# Patient Record
Sex: MALE | Race: WHITE | NOT HISPANIC OR LATINO | Employment: OTHER | URBAN - METROPOLITAN AREA
[De-identification: names, ages, dates, MRNs, and addresses within clinical notes are randomized per-mention and may not be internally consistent; named-entity substitution may affect disease eponyms.]

---

## 2017-07-11 ENCOUNTER — HOSPITAL ENCOUNTER (OUTPATIENT)
Dept: OTHER | Facility: HOSPITAL | Age: 72
Discharge: HOME | End: 2017-07-11
Attending: FAMILY MEDICINE | Admitting: FAMILY MEDICINE

## 2020-03-03 ENCOUNTER — TRANSCRIBE ORDERS (OUTPATIENT)
Dept: ADMINISTRATIVE | Facility: HOSPITAL | Age: 75
End: 2020-03-03

## 2020-03-03 DIAGNOSIS — N50.89 SCROTAL MASS: Primary | ICD-10-CM

## 2020-03-05 ENCOUNTER — HOSPITAL ENCOUNTER (OUTPATIENT)
Dept: ULTRASOUND IMAGING | Facility: HOSPITAL | Age: 75
Discharge: HOME | End: 2020-03-05
Admitting: STUDENT IN AN ORGANIZED HEALTH CARE EDUCATION/TRAINING PROGRAM

## 2020-03-05 DIAGNOSIS — N50.89 SCROTAL MASS: ICD-10-CM

## 2020-03-05 PROCEDURE — 76870 US EXAM SCROTUM: CPT

## 2020-11-10 ENCOUNTER — TRANSCRIBE ORDERS (OUTPATIENT)
Dept: PHYSICAL THERAPY | Facility: CLINIC | Age: 75
End: 2020-11-10

## 2020-11-10 DIAGNOSIS — M54.42 ACUTE LEFT-SIDED LOW BACK PAIN WITH LEFT-SIDED SCIATICA: Primary | ICD-10-CM

## 2020-11-13 ENCOUNTER — TREATMENT (OUTPATIENT)
Dept: PHYSICAL THERAPY | Facility: CLINIC | Age: 75
End: 2020-11-13

## 2020-11-13 DIAGNOSIS — M54.42 ACUTE LEFT-SIDED LOW BACK PAIN WITH LEFT-SIDED SCIATICA: Primary | ICD-10-CM

## 2020-11-13 DIAGNOSIS — R26.2 DIFFICULTY WALKING: ICD-10-CM

## 2020-11-13 PROCEDURE — G0283 ELEC STIM OTHER THAN WOUND: HCPCS | Performed by: PHYSICAL THERAPIST

## 2020-11-13 PROCEDURE — 97161 PT EVAL LOW COMPLEX 20 MIN: CPT | Performed by: PHYSICAL THERAPIST

## 2020-11-13 PROCEDURE — 97140 MANUAL THERAPY 1/> REGIONS: CPT | Performed by: PHYSICAL THERAPIST

## 2020-11-13 NOTE — PROGRESS NOTES
Physical Therapy Initial Evaluation and Plan of Care    Patient: Taqueria Starr   : 1945  Diagnosis/ICD-10 Code:  Midline low back pain with left-sided sciatica, unspecified chronicity [M54.42]  Referring practitioner: Reagan Rogers MD  Date of Initial Visit: 2020  Today's Date: 2020  Patient seen for 1 sessions           Subjective Questionnaire: Oswestry:       Subjective Evaluation    History of Present Illness  Mechanism of injury: Pt is a 74 yo male that returns to this clinic with recent onset radiating LLE pain.  Pain began about 3 weeks ago.  Had been working on nailing trip for wilmer at his daughter's house - kneeling then rising from ground repetitively.  Felt good but about 3 days later was running his saw mill at home and irritated his sciatic nerve.  Pain L calf, thigh and post/lat glut.  Is doing a lot better now than he had been.  Initially pain was so severe he was having trouble getting around and difficulty finding any position of comfort.  Pain currently 0/10 with medication.  Up to 9/10 at times.  Has also noticed some increased incidence of dizziness/unsteadiness over the past 3 weeks.  Feeling ok today.  Has not had a recent fall but has a history of a few significant falls over the past few years.      Quality of life: good    Pain  Current pain ratin  At worst pain ratin  Quality: dull ache and radiating  Relieving factors: change in position  Progression: improved    Social Support  Lives with: spouse    Treatments  Previous treatment: physical therapy  Current treatment: physical therapy  Patient Goals  Patient goals for therapy: decreased pain, independence with ADLs/IADLs and return to sport/leisure activities             Objective          Active Range of Motion     Additional Active Range of Motion Details  Trunk AROM in standing  Flex 50% with inc pull/pain L post thigh  Ext 25% with inc pain L glut  BSB 50% with inc pull on L with LSB  B Rot 75%  with no sig inc pain    Strength/Myotome Testing     Left Hip   Planes of Motion   Flexion: 4+  Abduction: 4+  Adduction: 4+    Right Hip   Planes of Motion   Flexion: 4+  Abduction: 4+  Adduction: 4+    Left Knee   Flexion: 4+  Extension: 4+    Right Knee   Flexion: 4+  Extension: 4+    Left Ankle/Foot   Dorsiflexion: 3  Inversion: 3+  Eversion: 3+    Right Ankle/Foot   Dorsiflexion: 4+  Inversion: 4+  Eversion: 4+     General Comments     Lumbar Comments  Gait - pt presents with mod antalgic gait and noted trendelenburg, no AD   Radiating pain L glut, post thigh and calf - worse with standing/walking  Posture- moderate forward flexed posture with shift to R  Moderate stiffness throughout spine           Assessment & Plan     Assessment  Impairments: abnormal gait, abnormal or restricted ROM, activity intolerance, impaired balance, impaired physical strength, lacks appropriate home exercise program, pain with function and safety issue  Assessment details: Pt presents with inc radiating LLE pain.  Weakness noted L ankle.  Difficulty walking.  Increased pain with standing long periods.  Difficulty sleeping through the night.  Increased dizziness/unsteadiness.    Prognosis: good  Functional Limitations: walking, uncomfortable because of pain and standing  Goals  Plan Goals: STG  Pt I with HEP in 2 weeks  To dec pain to 0-1/10 in 2-3 weeks.  To improve trunk ROM WFL with no inc pain.    LTG  To tolerate sleep through the night with no inc pain in 4-6 weeks.  To report no pain with ADLs in 4-6 weeks.    To report centralization of LLE symptoms in 4-6 weeks.      Plan  Therapy options: will be seen for skilled physical therapy services  Planned modality interventions: cryotherapy, electrical stimulation/Russian stimulation, traction, thermotherapy (hydrocollator packs) and dry needling  Planned therapy interventions: abdominal trunk stabilization, body mechanics training, flexibility, functional ROM exercises, home  exercise program, manual therapy, strengthening, stretching, therapeutic activities, balance/weight-bearing training, gait training, neuromuscular re-education and postural training  Frequency: 2x week  Duration in visits: 20  Treatment plan discussed with: patient  Plan details: Began with lumbar and LLE assessment.  Completed gentle stretching and finished with estim/heat L LB and L glut region.  Plan to progress mobility as able.  Continue to work on improving L ankle strength and balance.  May assess vertigo as needed.          Timed:         Manual Therapy:    8     mins  69127;     Therapeutic Exercise:    5     mins  78358;     Neuromuscular Aiden:        mins  92777;    Therapeutic Activity:          mins  56025;     Gait Training:           mins  72478;     Ultrasound:          mins  84099;    Ionto                                   mins   21579    Un-Timed:  Electrical Stimulation:    15     mins  50964 ( );  Dry Needling          mins self-pay  Traction          mins 78725  Low Eval     30     Mins  11333  Mod Eval          Mins  19590  High Eval                            Mins  29215        Timed Treatment:   13   mins   Total Treatment:     58   mins    PT SIGNATURE: Olimpia Panchal, PT   DATE TREATMENT INITIATED: 11/13/2020    Medicare Initial Certification  Certification Period: 2/11/2021  I certify that the therapy services are furnished while this patient is under my care.  The services outlined above are required by this patient, and will be reviewed every 90 days.     PHYSICIAN: Reagan Rogers MD      DATE:     Please sign and return via fax to [phone].. Thank you, [facility].

## 2020-11-17 ENCOUNTER — TREATMENT (OUTPATIENT)
Dept: PHYSICAL THERAPY | Facility: CLINIC | Age: 75
End: 2020-11-17

## 2020-11-17 DIAGNOSIS — M54.42 ACUTE LEFT-SIDED LOW BACK PAIN WITH LEFT-SIDED SCIATICA: Primary | ICD-10-CM

## 2020-11-17 DIAGNOSIS — R26.2 DIFFICULTY WALKING: ICD-10-CM

## 2020-11-17 PROCEDURE — G0283 ELEC STIM OTHER THAN WOUND: HCPCS | Performed by: PHYSICAL THERAPIST

## 2020-11-17 PROCEDURE — 97110 THERAPEUTIC EXERCISES: CPT | Performed by: PHYSICAL THERAPIST

## 2020-11-17 PROCEDURE — 97112 NEUROMUSCULAR REEDUCATION: CPT | Performed by: PHYSICAL THERAPIST

## 2020-11-17 PROCEDURE — 97140 MANUAL THERAPY 1/> REGIONS: CPT | Performed by: PHYSICAL THERAPIST

## 2020-11-17 NOTE — PROGRESS NOTES
Physical Therapy Daily Progress Note      Patient: Taqueria Starr   : 1945  Diagnosis/ICD-10 Code:  Acute left-sided low back pain with left-sided sciatica [M54.42]  Referring practitioner: Reagan Rogers MD  Date of Initial Visit: Type: THERAPY  Noted: 2020  Today's Date: 2020  Patient seen for 2 sessions         Taqueria Starr reports: his L LE has felt very bad intermittently hurting with pain currently in L calf this date. Pt. States he has been trying to stretch and exercise at home but notices a lot of times the dizziness makes it hard to do and he believes his dizziness has gotten worse. Pt. 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     Objective   See Exercise, Manual, and Modality Logs for complete treatment.     Assessment/Plan   Pt. Was educated on importance of healthy motion and use of L LE to improve symptoms and function.Pt. was educated on low being the source of LE pain and discomfort. Pt. Had multiple moments of dizziness varying in degree this visit and may benefit from a BPPV testing to rule out as factor. Pt. Completes all activities and stretches without pain today and reports feeling much better in leg following treatment but still complains of feeling off balance and mildly dizzy.    Progress per Plan of Care           Timed:         Manual Therapy:    15     mins  13156;     Therapeutic Exercise:    15     mins  04311;     Neuromuscular Aiden:    10    mins  35108;      Un-Timed:  Electrical Stimulation:    15     mins  43438 ( );      Timed Treatment:   40   mins   Total Treatment:     55   mins    Linda Campos PTA  Physical Therapist Assistant License #[id number]

## 2020-11-19 ENCOUNTER — TREATMENT (OUTPATIENT)
Dept: PHYSICAL THERAPY | Facility: CLINIC | Age: 75
End: 2020-11-19

## 2020-11-19 DIAGNOSIS — R26.2 DIFFICULTY WALKING: ICD-10-CM

## 2020-11-19 DIAGNOSIS — M54.42 ACUTE LEFT-SIDED LOW BACK PAIN WITH LEFT-SIDED SCIATICA: Primary | ICD-10-CM

## 2020-11-19 PROCEDURE — 97112 NEUROMUSCULAR REEDUCATION: CPT | Performed by: PHYSICAL THERAPIST

## 2020-11-19 PROCEDURE — G0283 ELEC STIM OTHER THAN WOUND: HCPCS | Performed by: PHYSICAL THERAPIST

## 2020-11-19 PROCEDURE — 97140 MANUAL THERAPY 1/> REGIONS: CPT | Performed by: PHYSICAL THERAPIST

## 2020-11-19 PROCEDURE — 97110 THERAPEUTIC EXERCISES: CPT | Performed by: PHYSICAL THERAPIST

## 2020-11-19 NOTE — PROGRESS NOTES
Physical Therapy Daily Progress Note      Patient: Taqueria Starr   : 1945  Diagnosis/ICD-10 Code:  Acute left-sided low back pain with left-sided sciatica [M54.42]   Problems Addressed this Visit     None      Visit Diagnoses     Acute left-sided low back pain with left-sided sciatica    -  Primary    Difficulty walking          Diagnoses       Codes Comments    Acute left-sided low back pain with left-sided sciatica    -  Primary ICD-10-CM: M54.42  ICD-9-CM: 724.2, 724.3     Difficulty walking     ICD-10-CM: R26.2  ICD-9-CM: 719.7          Referring practitioner: Reagan Rogers MD  Date of Initial Visit: Type: THERAPY  Noted: 2020  Today's Date: 2020    VISIT#: 3     Subjective No new changes.  Thinks he did too much yesterday.  Has been better able to sleep since starting therapy.  Would like some printed exercises for home.  Pt noted he has some trouble at home - his wife doesn't really speak to him.  Noted that dizziness is some better.  Spoke with PCP and they adjusted a few medications - they don't think it is BPPV causing his symptoms.      Objective Added nustep.      See Exercise, Manual, and Modality Logs for complete treatment.     Assessment/Plan Continued trouble with RLE pain.  Good tolerance to stretching.      Progress per Plan of Care         Timed:         Manual Therapy:    15     mins  44271;     Therapeutic Exercise:    15     mins  78503;     Neuromuscular Aiden:  10      mins  15746;    Therapeutic Activity:          mins  42450;     Gait Training:           mins  38315;     Ultrasound:          mins  95652;    Ionto                                   mins   33656  Self Care                            mins   30644  Canalith Repos                   mins  26692    Un-Timed:  Electrical Stimulation:    15     mins  86240 ( );  Dry Needling          mins self-pay  Traction          mins 08417  Low Eval          Mins  28058  Mod Eval          Mins  40953  High Eval                             Mins  34626  Re-Eval                               mins  05445    Timed Treatment:   40   mins   Total Treatment:     55   mins    Olimpia Panchal PT  Physical Therapist

## 2020-11-24 ENCOUNTER — TREATMENT (OUTPATIENT)
Dept: PHYSICAL THERAPY | Facility: CLINIC | Age: 75
End: 2020-11-24

## 2020-11-24 DIAGNOSIS — R26.2 DIFFICULTY WALKING: ICD-10-CM

## 2020-11-24 DIAGNOSIS — M54.42 ACUTE LEFT-SIDED LOW BACK PAIN WITH LEFT-SIDED SCIATICA: Primary | ICD-10-CM

## 2020-11-24 PROCEDURE — 97112 NEUROMUSCULAR REEDUCATION: CPT | Performed by: PHYSICAL THERAPIST

## 2020-11-24 PROCEDURE — 97110 THERAPEUTIC EXERCISES: CPT | Performed by: PHYSICAL THERAPIST

## 2020-11-24 PROCEDURE — G0283 ELEC STIM OTHER THAN WOUND: HCPCS | Performed by: PHYSICAL THERAPIST

## 2020-11-24 PROCEDURE — 97140 MANUAL THERAPY 1/> REGIONS: CPT | Performed by: PHYSICAL THERAPIST

## 2020-11-24 NOTE — PROGRESS NOTES
Physical Therapy Daily Progress Note      Patient: Taqueria Starr   : 1945  Diagnosis/ICD-10 Code:  Acute left-sided low back pain with left-sided sciatica [M54.42]   Problems Addressed this Visit     None      Visit Diagnoses     Acute left-sided low back pain with left-sided sciatica    -  Primary    Difficulty walking          Diagnoses       Codes Comments    Acute left-sided low back pain with left-sided sciatica    -  Primary ICD-10-CM: M54.42  ICD-9-CM: 724.2, 724.3     Difficulty walking     ICD-10-CM: R26.2  ICD-9-CM: 719.7          Referring practitioner: Reagan Rogers MD  Date of Initial Visit: Type: THERAPY  Noted: 2020  Today's Date: 2020    VISIT#: 4    Subjective Pt stated he has been doing some exercises at home but would like a list.     Objective Continued exercise with focus on balance and flexibility.  Finished with estim/LB.      See Exercise, Manual, and Modality Logs for complete treatment.     Assessment/Plan Pt tolerated tx well.      Progress per Plan of Care         Timed:         Manual Therapy:    15     mins  77718;     Therapeutic Exercise:    15     mins  42246;     Neuromuscular Aiden:   10     mins  14669;    Therapeutic Activity:          mins  72906;     Gait Training:           mins  91508;     Ultrasound:          mins  53795;    Ionto                                   mins   22832  Self Care                            mins   13034  Canalith Repos                   mins  29945    Un-Timed:  Electrical Stimulation:    15     mins  08902 ( );  Dry Needling          mins self-pay  Traction          mins 87068  Low Eval          Mins  61286  Mod Eval          Mins  31028  High Eval                            Mins  73854  Re-Eval                               mins  40090    Timed Treatment:   40   mins   Total Treatment:     55   mins    Olimpia Panchal PT  Physical Therapist

## 2020-12-01 ENCOUNTER — TREATMENT (OUTPATIENT)
Dept: PHYSICAL THERAPY | Facility: CLINIC | Age: 75
End: 2020-12-01

## 2020-12-01 DIAGNOSIS — R26.2 DIFFICULTY WALKING: ICD-10-CM

## 2020-12-01 DIAGNOSIS — M54.42 ACUTE LEFT-SIDED LOW BACK PAIN WITH LEFT-SIDED SCIATICA: Primary | ICD-10-CM

## 2020-12-01 PROCEDURE — G0283 ELEC STIM OTHER THAN WOUND: HCPCS | Performed by: PHYSICAL THERAPIST

## 2020-12-01 PROCEDURE — 97112 NEUROMUSCULAR REEDUCATION: CPT | Performed by: PHYSICAL THERAPIST

## 2020-12-01 PROCEDURE — 97140 MANUAL THERAPY 1/> REGIONS: CPT | Performed by: PHYSICAL THERAPIST

## 2020-12-01 NOTE — PROGRESS NOTES
Physical Therapy Daily Progress Note      Patient: Taqueria Starr   : 1945  Diagnosis/ICD-10 Code:  Acute left-sided low back pain with left-sided sciatica [M54.42]  Referring practitioner: Reagan Rogers MD  Date of Initial Visit: Type: THERAPY  Noted: 2020  Today's Date: 2020  Patient seen for 5 sessions         Taqueria Starr reports: he has been doing good up until he messed up and was helping move a trailer and stepped wrong and fell onto his L side on black top stating it has aggravated his knee and ankle pain and it has made the sciatic type symptoms worse as well where they had been improving significantly prior today. Pt. States he is hopeful the soreness will only last a couple of days.    Objective   See Exercise, Manual, and Modality Logs for complete treatment.     Assessment/Plan   Pt. Continues to tolerate strength and stretch well at this time. Pt. Has showed progress with set due to secondary factors this date. Pt. Was unable to perform as much single support activity due to L ankle pain. Pt. Requires frequent VC's for posture today.    Progress per Plan of Care           Timed:         Manual Therapy:    15     mins  36238;     Therapeutic Exercise:    10     mins  49535;     Neuromuscular Aiden:   10     mins  54715;   Un-Timed:  Electrical Stimulation:    15     mins  74874 ( );    Timed Treatment:   35   mins   Total Treatment:     50   mins    Linda Campos PTA  Physical Therapist Assistant License #[id number]

## 2020-12-03 ENCOUNTER — TREATMENT (OUTPATIENT)
Dept: PHYSICAL THERAPY | Facility: CLINIC | Age: 75
End: 2020-12-03

## 2020-12-03 DIAGNOSIS — M54.42 ACUTE LEFT-SIDED LOW BACK PAIN WITH LEFT-SIDED SCIATICA: Primary | ICD-10-CM

## 2020-12-03 DIAGNOSIS — R26.2 DIFFICULTY WALKING: ICD-10-CM

## 2020-12-03 PROCEDURE — G0283 ELEC STIM OTHER THAN WOUND: HCPCS | Performed by: PHYSICAL THERAPIST

## 2020-12-03 PROCEDURE — 97140 MANUAL THERAPY 1/> REGIONS: CPT | Performed by: PHYSICAL THERAPIST

## 2020-12-03 PROCEDURE — 97110 THERAPEUTIC EXERCISES: CPT | Performed by: PHYSICAL THERAPIST

## 2020-12-03 NOTE — PROGRESS NOTES
Physical Therapy Daily Progress Note      Patient: Taqueria Starr   : 1945  Diagnosis/ICD-10 Code:  Acute left-sided low back pain with left-sided sciatica [M54.42]  Referring practitioner: Reagan Rogers MD  Date of Initial Visit: Type: THERAPY  Noted: 2020  Today's Date: 12/3/2020  Patient seen for 6 sessions         Taqueria Starr reports: he is doing well but continues to have a sore knee after his fall the other day but states his symptoms into his calf remain unchanging.    Objective   See Exercise, Manual, and Modality Logs for complete treatment.     Assessment/Plan   Pt. Still presents very limited in LE mobility due to tightness and symptoms remain unchanged with most stretches however LAD provides brief relief at this time. Pt. performs all exercises well and has began to improve. Half Tandem was performed for balance this visit and proved to be very difficult.    Progress per Plan of Care           Timed:         Manual Therapy:    15     mins  60028;     Therapeutic Exercise:    15     mins  52341;       Un-Timed:  Electrical Stimulation:    15     mins  73650 ( );    Timed Treatment:   30   mins   Total Treatment:     45   mins    Linda Camops PTA  Physical Therapist Assistant License #[id number]

## 2020-12-08 ENCOUNTER — TREATMENT (OUTPATIENT)
Dept: PHYSICAL THERAPY | Facility: CLINIC | Age: 75
End: 2020-12-08

## 2020-12-08 DIAGNOSIS — M54.42 ACUTE LEFT-SIDED LOW BACK PAIN WITH LEFT-SIDED SCIATICA: Primary | ICD-10-CM

## 2020-12-08 DIAGNOSIS — R26.2 DIFFICULTY WALKING: ICD-10-CM

## 2020-12-08 PROCEDURE — 97110 THERAPEUTIC EXERCISES: CPT | Performed by: PHYSICAL THERAPIST

## 2020-12-08 PROCEDURE — 97140 MANUAL THERAPY 1/> REGIONS: CPT | Performed by: PHYSICAL THERAPIST

## 2020-12-08 PROCEDURE — G0283 ELEC STIM OTHER THAN WOUND: HCPCS | Performed by: PHYSICAL THERAPIST

## 2020-12-08 PROCEDURE — 97112 NEUROMUSCULAR REEDUCATION: CPT | Performed by: PHYSICAL THERAPIST

## 2020-12-08 NOTE — PROGRESS NOTES
Physical Therapy Daily Progress Note      Patient: Taqueria Starr   : 1945  Diagnosis/ICD-10 Code:  Acute left-sided low back pain with left-sided sciatica [M54.42]   Problems Addressed this Visit     None      Visit Diagnoses     Acute left-sided low back pain with left-sided sciatica    -  Primary    Difficulty walking          Diagnoses       Codes Comments    Acute left-sided low back pain with left-sided sciatica    -  Primary ICD-10-CM: M54.42  ICD-9-CM: 724.2, 724.3     Difficulty walking     ICD-10-CM: R26.2  ICD-9-CM: 719.7          Referring practitioner: Reagan Rogers MD  Date of Initial Visit: Type: THERAPY  Noted: 2020  Today's Date: 2020    VISIT#: 7    Subjective Pt stated he is recovering from his fall the other day.   L knee is still sore.  Heat has been helping LLE.      Objective Pt completed strength and balance activities.  Finished with stretching then estim.      See Exercise, Manual, and Modality Logs for complete treatment.     Assessment/Plan Pt tolerated tx well.      Progress per Plan of Care         Timed:         Manual Therapy:    15     mins  85831;     Therapeutic Exercise:    15     mins  87166;     Neuromuscular Aiden:    10    mins  91282;    Therapeutic Activity:          mins  13825;     Gait Training:           mins  66701;     Ultrasound:          mins  81872;    Ionto                                   mins   31343  Self Care                            mins   11321  Canalith Repos                   mins  88810    Un-Timed:  Electrical Stimulation:    15     mins  77640 ( );  Dry Needling          mins self-pay  Traction          mins 16187  Low Eval          Mins  83506  Mod Eval          Mins  35298  High Eval                            Mins  60011  Re-Eval                               mins  41682    Timed Treatment:  40    mins   Total Treatment:     55   mins    Olimpia Panchal PT  Physical Therapist

## 2020-12-10 ENCOUNTER — TREATMENT (OUTPATIENT)
Dept: PHYSICAL THERAPY | Facility: CLINIC | Age: 75
End: 2020-12-10

## 2020-12-10 DIAGNOSIS — M54.42 ACUTE LEFT-SIDED LOW BACK PAIN WITH LEFT-SIDED SCIATICA: Primary | ICD-10-CM

## 2020-12-10 DIAGNOSIS — R26.2 DIFFICULTY WALKING: ICD-10-CM

## 2020-12-10 PROCEDURE — G0283 ELEC STIM OTHER THAN WOUND: HCPCS | Performed by: PHYSICAL THERAPIST

## 2020-12-10 PROCEDURE — 97112 NEUROMUSCULAR REEDUCATION: CPT | Performed by: PHYSICAL THERAPIST

## 2020-12-10 PROCEDURE — 97140 MANUAL THERAPY 1/> REGIONS: CPT | Performed by: PHYSICAL THERAPIST

## 2020-12-10 PROCEDURE — 97110 THERAPEUTIC EXERCISES: CPT | Performed by: PHYSICAL THERAPIST

## 2020-12-10 NOTE — PROGRESS NOTES
Physical Therapy Daily Progress Note      Patient: Taqueria Starr   : 1945  Diagnosis/ICD-10 Code:  Acute left-sided low back pain with left-sided sciatica [M54.42]   Problems Addressed this Visit     None      Visit Diagnoses     Acute left-sided low back pain with left-sided sciatica    -  Primary    Difficulty walking          Diagnoses       Codes Comments    Acute left-sided low back pain with left-sided sciatica    -  Primary ICD-10-CM: M54.42  ICD-9-CM: 724.2, 724.3     Difficulty walking     ICD-10-CM: R26.2  ICD-9-CM: 719.7          Referring practitioner: Reagan Rogers MD  Date of Initial Visit: Type: THERAPY  Noted: 2020  Today's Date: 12/10/2020    VISIT#: 8    Subjective Difficulty with SLS LLE - inc pain wrapping around ant hip region.      Objective Discussed that L hip imaging may be benficial.  Much of pain appears to be lumbar radiculopathy but some symptoms appear to be related to L hip OA.      See Exercise, Manual, and Modality Logs for complete treatment.     Assessment/Plan Continue work on LE strength and balance.      Progress per Plan of Care         Timed:         Manual Therapy:    15     mins  11507;     Therapeutic Exercise:    15     mins  45841;     Neuromuscular Aiden:    10    mins  72006;    Therapeutic Activity:          mins  86920;     Gait Training:           mins  34857;     Ultrasound:          mins  07381;    Ionto                                   mins   34457  Self Care                            mins   60183  Canalith Repos                   mins  69720    Un-Timed:  Electrical Stimulation:    15     mins  31006 ( );  Dry Needling          mins self-pay  Traction          mins 70094  Low Eval          Mins  03723  Mod Eval          Mins  44873  High Eval                            Mins  82880  Re-Eval                               mins  24202    Timed Treatment:   40   mins   Total Treatment:     55   mins    Olimpia Panchal PT  Physical Therapist

## 2020-12-15 ENCOUNTER — TREATMENT (OUTPATIENT)
Dept: PHYSICAL THERAPY | Facility: CLINIC | Age: 75
End: 2020-12-15

## 2020-12-15 DIAGNOSIS — R26.2 DIFFICULTY WALKING: ICD-10-CM

## 2020-12-15 DIAGNOSIS — M54.42 ACUTE LEFT-SIDED LOW BACK PAIN WITH LEFT-SIDED SCIATICA: Primary | ICD-10-CM

## 2020-12-15 PROCEDURE — 97110 THERAPEUTIC EXERCISES: CPT | Performed by: PHYSICAL THERAPIST

## 2020-12-15 PROCEDURE — 97112 NEUROMUSCULAR REEDUCATION: CPT | Performed by: PHYSICAL THERAPIST

## 2020-12-15 PROCEDURE — G0283 ELEC STIM OTHER THAN WOUND: HCPCS | Performed by: PHYSICAL THERAPIST

## 2020-12-15 PROCEDURE — 97140 MANUAL THERAPY 1/> REGIONS: CPT | Performed by: PHYSICAL THERAPIST

## 2020-12-15 NOTE — PROGRESS NOTES
Physical Therapy Daily Progress Note      Patient: Taqueria Starr   : 1945  Diagnosis/ICD-10 Code:  Acute left-sided low back pain with left-sided sciatica [M54.42]   Problems Addressed this Visit     None      Visit Diagnoses     Acute left-sided low back pain with left-sided sciatica    -  Primary    Difficulty walking          Diagnoses       Codes Comments    Acute left-sided low back pain with left-sided sciatica    -  Primary ICD-10-CM: M54.42  ICD-9-CM: 724.2, 724.3     Difficulty walking     ICD-10-CM: R26.2  ICD-9-CM: 719.7          Referring practitioner: Reagan Rogers MD  Date of Initial Visit: Type: THERAPY  Noted: 2020  Today's Date: 12/15/2020    VISIT#: 9    Subjective Pt stated that he is doing alright.  No new changes.  Wanting to hire someone to caulk his ceiling in his house to limit draft.      Objective Completed exercise with focus on flexibility, strength and balance.      See Exercise, Manual, and Modality Logs for complete treatment.     Assessment/Plan L thigh cramped during supine exercises, improved with stretching.      Progress per Plan of Care         Timed:         Manual Therapy:    15     mins  76568;     Therapeutic Exercise:    15     mins  14079;     Neuromuscular Aiden:    10    mins  31362;    Therapeutic Activity:          mins  03189;     Gait Training:           mins  46664;     Ultrasound:          mins  99263;    Ionto                                   mins   81604  Self Care                            mins   90245  Canalith Repos                   mins  12867    Un-Timed:  Electrical Stimulation:    15     mins  73371 ( );  Dry Needling          mins self-pay  Traction          mins 19835  Low Eval          Mins  86495  Mod Eval          Mins  81957  High Eval                            Mins  66830  Re-Eval                               mins  33393    Timed Treatment:   40   mins   Total Treatment:     55   mins    Olimpia Panchal PT  Physical  Therapist

## 2020-12-17 ENCOUNTER — TREATMENT (OUTPATIENT)
Dept: PHYSICAL THERAPY | Facility: CLINIC | Age: 75
End: 2020-12-17

## 2020-12-17 DIAGNOSIS — R26.2 DIFFICULTY WALKING: ICD-10-CM

## 2020-12-17 DIAGNOSIS — M54.42 ACUTE LEFT-SIDED LOW BACK PAIN WITH LEFT-SIDED SCIATICA: Primary | ICD-10-CM

## 2020-12-17 PROCEDURE — G0283 ELEC STIM OTHER THAN WOUND: HCPCS | Performed by: PHYSICAL THERAPIST

## 2020-12-17 PROCEDURE — 97140 MANUAL THERAPY 1/> REGIONS: CPT | Performed by: PHYSICAL THERAPIST

## 2020-12-17 PROCEDURE — 97110 THERAPEUTIC EXERCISES: CPT | Performed by: PHYSICAL THERAPIST

## 2020-12-17 PROCEDURE — 97112 NEUROMUSCULAR REEDUCATION: CPT | Performed by: PHYSICAL THERAPIST

## 2020-12-17 NOTE — PROGRESS NOTES
Physical Therapy Daily Progress Note      Patient: Taqueria Starr   : 1945  Diagnosis/ICD-10 Code:  Acute left-sided low back pain with left-sided sciatica [M54.42]  Referring practitioner: Reagan Rogers MD  Date of Initial Visit: Type: THERAPY  Noted: 2020  Today's Date: 2020  Patient seen for 10 sessions         Taqueria Starr reports: he continues to do better and states his leg has not bothered him as much today and states the aching in his low back is more prevalent at this time.    Oswestry  = 4%    Objective   See Exercise, Manual, and Modality Logs for complete treatment.     Assessment/Plan  Pt. continues to benefit from stretching for improved mobility and pain relief Pt. continues to tolerate exercise well for strength and balance. Pt. repsonds well to modalities for relief of calf soreness and needs contineus therapy for improved mobility and endurance for ADL's.    Goals  Plan Goals: STG  Pt I with HEP in 2 weeks Progressing  To dec pain to 0-1/10 in 2-3 weeks. Progressing  To improve trunk ROM WFL with no inc pain.  MET  LTG  To tolerate sleep through the night with no inc pain in 4-6 weeks. Most Nights Progressing  To report no pain with ADLs in 4-6 weeks.  Not MET  To report centralization of LLE symptoms in 4-6 weeks. Progressing    Continue per POC up to 20 visits         Timed:         Manual Therapy:    10     mins  95035;     Therapeutic Exercise:    20     mins  52936;     Neuromuscular Aiden:   10     mins  26687;      Un-Timed:  Electrical Stimulation:    15     mins  71390 ( );    Timed Treatment:   40   mins   Total Treatment:     55   mins    Linda Campos PTA  Physical Therapist Assistant License #[id number]

## 2021-02-19 ENCOUNTER — DOCUMENTATION (OUTPATIENT)
Dept: PHYSICAL THERAPY | Facility: CLINIC | Age: 76
End: 2021-02-19

## 2021-02-19 DIAGNOSIS — R26.2 DIFFICULTY WALKING: ICD-10-CM

## 2021-02-19 DIAGNOSIS — M54.42 ACUTE LEFT-SIDED LOW BACK PAIN WITH LEFT-SIDED SCIATICA: Primary | ICD-10-CM

## 2021-02-19 NOTE — PROGRESS NOTES
Discharge Summary  Discharge Summary from Physical Therapy Report      Dates  PT visit: Nov-Dec 2020  Number of Visits: 10     Discharge Status of Patient: See MD Note dated 12/17/20    Goals: Partially Met    Discharge Plan: Continue with current home exercise program as instructed    Comments Plan to DC with HEP at this time.      Date of Discharge 12/17/20        Olimpia Panchal, PT  Physical Therapist

## 2021-10-06 ENCOUNTER — TRANSCRIBE ORDERS (OUTPATIENT)
Dept: PHYSICAL THERAPY | Facility: CLINIC | Age: 76
End: 2021-10-06

## 2021-10-06 DIAGNOSIS — M25.579 ANKLE PAIN, UNSPECIFIED CHRONICITY, UNSPECIFIED LATERALITY: Primary | ICD-10-CM

## 2021-10-18 ENCOUNTER — TREATMENT (OUTPATIENT)
Dept: PHYSICAL THERAPY | Facility: CLINIC | Age: 76
End: 2021-10-18

## 2021-10-18 DIAGNOSIS — M19.071 DJD (DEGENERATIVE JOINT DISEASE), ANKLE AND FOOT, RIGHT: ICD-10-CM

## 2021-10-18 DIAGNOSIS — M25.371 ANKLE INSTABILITY, RIGHT: Primary | ICD-10-CM

## 2021-10-18 DIAGNOSIS — R26.2 DIFFICULTY WALKING: ICD-10-CM

## 2021-10-18 PROCEDURE — 97161 PT EVAL LOW COMPLEX 20 MIN: CPT | Performed by: PHYSICAL THERAPIST

## 2021-10-18 PROCEDURE — 97110 THERAPEUTIC EXERCISES: CPT | Performed by: PHYSICAL THERAPIST

## 2021-10-18 NOTE — PROGRESS NOTES
Physical Therapy Initial Evaluation and Plan of Care    Patient: Taqueria Starr   : 1945  Diagnosis/ICD-10 Code:  Ankle instability, right [M25.371]  Referring practitioner: Carlos Vincent MD  Date of Initial Visit: 10/18/2021  Today's Date: 10/18/2021  Patient seen for 1 sessions           Subjective Questionnaire: FAAM = 76/84, indicating 90% ability      Subjective Evaluation    History of Present Illness  Mechanism of injury: Pt with c/o issues with R ankle for several months. States he bought a boot about a month ago and wears it sometimes but it does not fit well. States his R ankle rolls very easily. Having difficulty walking on uneven surfaces. Has difficulty when he runs his [employer]. Denies any pain in R ankle currently and states it hurts if he turns ankle or walking on uneven surfaces. Reports having swelling at outside of R ankle. Swelling increases with activity or if he turns it. Difficulty going up and down stairs. Pt reports having arthritis in his back and difficulty getting up from chairs, especially low chairs. 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      Patient Occupation: retired Quality of life: good    Pain  Current pain ratin  At best pain ratin  At worst pain ratin  Location: R ankle  Quality: dull ache  Relieving factors: rest  Aggravating factors: stairs, prolonged positioning, movement and squatting  Progression: worsening    Social Support  Lives with: states he and wife are .    Patient Goals  Patient goals for therapy: decreased pain, improved balance, increased motion, decreased edema, increased strength and return to sport/leisure activities             Objective          Observations     Additional Ankle/Foot Observation Details  Static standing: R calcaneovarus, increased wt bearing on lateral half of foot.  Gait: decreased tomás, decreased R stance time. Decreased R heel strike and toe off. Mild R  trendelenburg.    Palpation     Additional Palpation Details  No tenderness to palpation.    Active Range of Motion   Left Ankle/Foot   Normal active range of motion  Dorsiflexion (ke): 10 degrees     Right Ankle/Foot   Normal active range of motion  Dorsiflexion (ke): 0 degrees     Additional Active Range of Motion Details  Tightness present R gastroc.    Strength/Myotome Testing     Left Knee   Flexion: 5  Extension: 5    Right Knee   Flexion: 5  Extension: 5    Left Ankle/Foot   Dorsiflexion: 5  Plantar flexion: 5  Inversion: 5  Eversion: 4+    Right Ankle/Foot   Dorsiflexion: 4+  Plantar flexion: 4  Inversion: 4  Eversion: 3+    Functional Assessment     Single Leg Stance   Left: 12 seconds  Right: 1 seconds          Assessment & Plan     Assessment  Impairments: abnormal gait, abnormal or restricted ROM, activity intolerance, impaired balance, impaired physical strength, lacks appropriate home exercise program and pain with function  Assessment details: Pt is 75 yo male with c/o R ankle weakness and instability. Pt presents with decrased R ankle strength and tightness of B gastroc. Pt is having difficulty walking on uneven surfaces. 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 Pt with poor SLS ability on R. Difficulty going up and down stairs. Pt's goal is to be able to walk better on uneven surfaces.    Patient presents with the impairments listed above and based on the objective findings and the physical therapy evaluation, the patient’s condition has the potential to improve in response to therapy.   The patient’s condition and/or services required are at a level of complexity that necessitates the skill & supervision of a physical therapist.    Prognosis: good  Functional Limitations: sleeping, walking, uncomfortable because of pain, sitting, standing and stooping  Goals  Plan Goals: STG:  - Increase R ankle DF AROM to 5 deg in 3 weeks.  - Pt to perform SLS x10 sec in 3 weeks.  - Pt to perform HEP with minimal  verbal cues in 3 weeks.  LTG:  - Improve FAAM score to 95% or better ability by discharge.  - Increase R ankle eversion strength to 4/5 by discharge.  - Pt to report 50% or better improvement in frequency of R ankle rolling when walking on uneven surfaces by discharge.  - Pt to be independent with HEP by discharge.    Plan  Therapy options: will be seen for skilled physical therapy services  Planned modality interventions: thermotherapy (hydrocollator packs) and cryotherapy  Other planned modality interventions: modalities as indicated  Planned therapy interventions: balance/weight-bearing training, manual therapy, flexibility, functional ROM exercises, gait training, home exercise program, joint mobilization, neuromuscular re-education, soft tissue mobilization, strengthening, stretching and therapeutic activities  Frequency: 2x week  Duration in weeks: 6        Timed:         Manual Therapy:    5     mins  62962;     Therapeutic Exercise:    15     mins  07103;     Neuromuscular Aidne:        mins  28884;    Therapeutic Activity:          mins  76422;     Gait Training:           mins  30834;     Ultrasound:          mins  80738;    Ionto                                   mins   28646  Can Repos          mins 09230    Un-Timed:  Electrical Stimulation:         mins  36042 ( );  Dry Needling          mins self-pay  Traction          mins 65849  Low Eval     30     Mins  82772  Mod Eval          Mins  33257  High Eval                            Mins  97331  Self - Care                          mins  60786        Timed Treatment:  20   mins   Total Treatment:     50   mins    PT SIGNATURE: Niru Heaton PT   DATE TREATMENT INITIATED: 10/18/2021    Initial Certification  Certification Period: 1/16/2022  I certify that the therapy services are furnished while this patient is under my care.  The services outlined above are required by this patient, and will be reviewed every 90 days.     PHYSICIAN: Melisa  Carlos León, MD ________________________________________________________     DATE:  ____________________________________________________    Please sign and return via fax to [phone].. Thank you, [facility].

## 2021-10-20 ENCOUNTER — TREATMENT (OUTPATIENT)
Dept: PHYSICAL THERAPY | Facility: CLINIC | Age: 76
End: 2021-10-20

## 2021-10-20 DIAGNOSIS — M25.371 ANKLE INSTABILITY, RIGHT: Primary | ICD-10-CM

## 2021-10-20 PROCEDURE — 97112 NEUROMUSCULAR REEDUCATION: CPT | Performed by: PHYSICAL THERAPIST

## 2021-10-20 PROCEDURE — 97110 THERAPEUTIC EXERCISES: CPT | Performed by: PHYSICAL THERAPIST

## 2021-10-20 PROCEDURE — 97530 THERAPEUTIC ACTIVITIES: CPT | Performed by: PHYSICAL THERAPIST

## 2021-10-20 NOTE — PROGRESS NOTES
Physical Therapy Daily Progress Note      Patient: Taqueria Starr   : 1945  Diagnosis/ICD-10 Code:  Ankle instability, right [M25.371]   Problems Addressed this Visit     None      Visit Diagnoses     Ankle instability, right    -  Primary      Diagnoses       Codes Comments    Ankle instability, right    -  Primary ICD-10-CM: M25.371  ICD-9-CM: 718.87         Referring practitioner: Carlos Vincent MD  Date of Initial Visit: Type: THERAPY  Noted: 10/18/2021  Today's Date: 10/20/2021    VISIT#: 2    Subjective : pt reports he had a busy day yesterday and did not have any ankle soreness at end of day. Feels like he is walking a little better. No soreness after initial visit.    Objective : Added prostretch, ankle 4-way, leg press, and seated elliptical to ther ex. Initiated SLS also with modifications needed due to poor ability. Performed with opposites toes on floor behind him.  Multiple cues required for technique and form during ther ex.   See Exercise, Manual, and Modality Logs for complete treatment.     Assessment/Plan  : Pt reporting improved ambulation ability. Good tolerance to ther ex with no reports of pain. Cues required for technique during most activities due to pt with decreased body awareness. SLS very challenging for pt. Pt will benefit from continued R ankle strengthening and balance training to improved stability of R ankle and improve ambulation tolerance on uneven surfaces.  STG:  - Increase R ankle DF AROM to 5 deg in 3 weeks.   - Pt to perform SLS x10 sec in 3 weeks.   - Pt to perform HEP with minimal verbal cues in 3 weeks.   LTG:  - Improve FAAM score to 95% or better ability by discharge.   - Increase R ankle eversion strength to 4/5 by discharge.  - Pt to report 50% or better improvement in frequency of R ankle rolling when walking on uneven surfaces by discharge.   - Pt to be independent with HEP by discharge.    Progress per Plan of Care         Timed:         Manual Therapy:     5     mins  32256;     Therapeutic Exercise:    20     mins  71743;     Neuromuscular Aiden:    8    mins  68962;    Therapeutic Activity:     10     mins  77795;     Gait Training:           mins  91190;     Ultrasound:          mins  58853;    Ionto                                   mins   49045  Self Care                            mins   12338  Canalith Repos                   mins  33113    Un-Timed:  Electrical Stimulation:         mins  16608 ( );  Dry Needling          mins self-pay  Traction          mins 04258  Low Eval          Mins  34857  Mod Eval          Mins  15991  High Eval                            Mins  84458  Re-Eval                               mins  98662    Timed Treatment:   43   mins   Total Treatment:     43   mins    Niru Heaton, PT  Physical Therapist

## 2021-10-26 ENCOUNTER — TREATMENT (OUTPATIENT)
Dept: PHYSICAL THERAPY | Facility: CLINIC | Age: 76
End: 2021-10-26

## 2021-10-26 DIAGNOSIS — M54.42 ACUTE LEFT-SIDED LOW BACK PAIN WITH LEFT-SIDED SCIATICA: ICD-10-CM

## 2021-10-26 DIAGNOSIS — M25.371 ANKLE INSTABILITY, RIGHT: Primary | ICD-10-CM

## 2021-10-26 DIAGNOSIS — R26.2 DIFFICULTY WALKING: ICD-10-CM

## 2021-10-26 DIAGNOSIS — M19.071 DJD (DEGENERATIVE JOINT DISEASE), ANKLE AND FOOT, RIGHT: ICD-10-CM

## 2021-10-26 PROCEDURE — 97110 THERAPEUTIC EXERCISES: CPT | Performed by: PHYSICAL THERAPIST

## 2021-10-26 PROCEDURE — 97530 THERAPEUTIC ACTIVITIES: CPT | Performed by: PHYSICAL THERAPIST

## 2021-10-26 NOTE — PROGRESS NOTES
Physical Therapy Daily Progress Note      Patient: Taqueria Starr   : 1945  Diagnosis/ICD-10 Code:  Ankle instability, right [M25.371]   Problems Addressed this Visit     None      Visit Diagnoses     Ankle instability, right    -  Primary    DJD (degenerative joint disease), ankle and foot, right        Difficulty walking        Acute left-sided low back pain with left-sided sciatica          Diagnoses       Codes Comments    Ankle instability, right    -  Primary ICD-10-CM: M25.371  ICD-9-CM: 718.87     DJD (degenerative joint disease), ankle and foot, right     ICD-10-CM: M19.071  ICD-9-CM: 715.97     Difficulty walking     ICD-10-CM: R26.2  ICD-9-CM: 719.7     Acute left-sided low back pain with left-sided sciatica     ICD-10-CM: M54.42  ICD-9-CM: 724.2, 724.3          Referring practitioner: Carlos Vincent MD  Date of Initial Visit: Type: THERAPY  Noted: 10/18/2021  Today's Date: 10/26/2021    VISIT#: 3    Subjective Patient reports having continued tightness in R foot ankle.       Objective Patient arrived 30 minutes late to appointment exercise list adjusted due to time restraints per patient having eye doctor appointment.     See Exercise, Manual, and Modality Logs for complete treatment.     Assessment/Plan Patient with continued R gastroc/heel cord tightness responded well to manual interventions. Patient will continue benefit from improved DF and ankle general ankle strengthening for improved stability with functional daily activity.   Plan Goals: STG:  - Increase R ankle DF AROM to 5 deg in 3 weeks.  - Pt to perform SLS x10 sec in 3 weeks.  - Pt to perform HEP with minimal verbal cues in 3 weeks.  LTG:  - Improve FAAM score to 95% or better ability by discharge.  - Increase R ankle eversion strength to 4/5 by discharge.  - Pt to report 50% or better improvement in frequency of R ankle rolling when walking on uneven surfaces by discharge.  - Pt to be independent with HEP by  discharge.  Progress per Plan of Care         Timed:         Manual Therapy:    5     mins  59636;     Therapeutic Exercise:    15     mins  18971;     Neuromuscular Aiden:    5    mins  13693;    Therapeutic Activity:     10     mins  71675;     Gait Training:           mins  60896;     Ultrasound:          mins  46683;    Ionto                                   mins   24526  Self Care                    _____  mins   18443  Canalith Repos                   mins  25183    Un-Timed:  Electrical Stimulation:         mins  21113 ( );  Dry Needling          mins self-pay  Traction          mins 19245    Timed Treatment:   35   mins   Total Treatment:     35   mins    Moisés Baker PTA  [place] License [id number]  Physical Therapist Assistant

## 2021-10-28 ENCOUNTER — TREATMENT (OUTPATIENT)
Dept: PHYSICAL THERAPY | Facility: CLINIC | Age: 76
End: 2021-10-28

## 2021-10-28 DIAGNOSIS — M54.42 ACUTE LEFT-SIDED LOW BACK PAIN WITH LEFT-SIDED SCIATICA: ICD-10-CM

## 2021-10-28 DIAGNOSIS — M25.371 ANKLE INSTABILITY, RIGHT: Primary | ICD-10-CM

## 2021-10-28 DIAGNOSIS — R26.2 DIFFICULTY WALKING: ICD-10-CM

## 2021-10-28 DIAGNOSIS — M19.071 DJD (DEGENERATIVE JOINT DISEASE), ANKLE AND FOOT, RIGHT: ICD-10-CM

## 2021-10-28 PROCEDURE — 97112 NEUROMUSCULAR REEDUCATION: CPT | Performed by: PHYSICAL THERAPIST

## 2021-10-28 PROCEDURE — 97110 THERAPEUTIC EXERCISES: CPT | Performed by: PHYSICAL THERAPIST

## 2021-10-28 PROCEDURE — 97530 THERAPEUTIC ACTIVITIES: CPT | Performed by: PHYSICAL THERAPIST

## 2021-10-28 NOTE — PROGRESS NOTES
Physical Therapy Daily Progress Note      Patient: Taqueria Starr   : 1945  Diagnosis/ICD-10 Code:  Ankle instability, right [M25.371]   Problems Addressed this Visit     None      Visit Diagnoses     Ankle instability, right    -  Primary    DJD (degenerative joint disease), ankle and foot, right        Difficulty walking        Acute left-sided low back pain with left-sided sciatica          Diagnoses       Codes Comments    Ankle instability, right    -  Primary ICD-10-CM: M25.371  ICD-9-CM: 718.87     DJD (degenerative joint disease), ankle and foot, right     ICD-10-CM: M19.071  ICD-9-CM: 715.97     Difficulty walking     ICD-10-CM: R26.2  ICD-9-CM: 719.7     Acute left-sided low back pain with left-sided sciatica     ICD-10-CM: M54.42  ICD-9-CM: 724.2, 724.3          Referring practitioner: Carlos Vincent MD  Date of Initial Visit: Type: THERAPY  Noted: 10/18/2021  Today's Date: 10/28/2021    VISIT#: 4    Subjective Patient reports stretching a little before coming into clinic today and is feeling a little better today.       Objective added gastroc slantboard stretch, heels toe raise.     See Exercise, Manual, and Modality Logs for complete treatment.     Assessment/Plan  Patient tolerated progressed therapeutic exercise well with decreased symptoms reported. Patient gradually demonstrating improved dorsiflexion on R LE.    Plan Goals: STG:  - Increase R ankle DF AROM to 5 deg in 3 weeks.  - Pt to perform SLS x10 sec in 3 weeks.  - Pt to perform HEP with minimal verbal cues in 3 weeks.  LTG:  - Improve FAAM score to 95% or better ability by discharge.  - Increase R ankle eversion strength to 4/5 by discharge.  - Pt to report 50% or better improvement in frequency of R ankle rolling when walking on uneven surfaces by discharge.  - Pt to be independent with HEP by discharge.    Progress per Plan of Care         Timed:         Manual Therapy:    5     mins  12779;     Therapeutic Exercise:    15      mins  47958;     Neuromuscular Aiden:    10    mins  46671;    Therapeutic Activity:     15     mins  52482;     Gait Training:           mins  99678;     Ultrasound:          mins  91721;    Ionto                                   mins   47183  Self Care                    _____  mins   92846  Canalith Repos                  mins  75765    Un-Timed:  Electrical Stimulation:         mins  59775 ( );  Dry Needling          mins self-pay  Traction          mins 13940    Timed Treatment:   45   mins   Total Treatment:     45   mins    Moisés Baker PTA  [place] License [id number]  Physical Therapist Assistant

## 2021-11-02 ENCOUNTER — TREATMENT (OUTPATIENT)
Dept: PHYSICAL THERAPY | Facility: CLINIC | Age: 76
End: 2021-11-02

## 2021-11-02 DIAGNOSIS — M19.071 DJD (DEGENERATIVE JOINT DISEASE), ANKLE AND FOOT, RIGHT: ICD-10-CM

## 2021-11-02 DIAGNOSIS — M25.371 ANKLE INSTABILITY, RIGHT: Primary | ICD-10-CM

## 2021-11-02 DIAGNOSIS — R26.2 DIFFICULTY WALKING: ICD-10-CM

## 2021-11-02 DIAGNOSIS — M54.42 ACUTE LEFT-SIDED LOW BACK PAIN WITH LEFT-SIDED SCIATICA: ICD-10-CM

## 2021-11-02 PROCEDURE — 97530 THERAPEUTIC ACTIVITIES: CPT | Performed by: PHYSICAL THERAPIST

## 2021-11-02 PROCEDURE — 97110 THERAPEUTIC EXERCISES: CPT | Performed by: PHYSICAL THERAPIST

## 2021-11-02 PROCEDURE — 97112 NEUROMUSCULAR REEDUCATION: CPT | Performed by: PHYSICAL THERAPIST

## 2021-11-02 NOTE — PROGRESS NOTES
Physical Therapy Daily Progress Note      Patient: Taqueria Starr   : 1945  Diagnosis/ICD-10 Code:  Ankle instability, right [M25.371]   Problems Addressed this Visit     None      Visit Diagnoses     Ankle instability, right    -  Primary    DJD (degenerative joint disease), ankle and foot, right        Difficulty walking        Acute left-sided low back pain with left-sided sciatica          Diagnoses       Codes Comments    Ankle instability, right    -  Primary ICD-10-CM: M25.371  ICD-9-CM: 718.87     DJD (degenerative joint disease), ankle and foot, right     ICD-10-CM: M19.071  ICD-9-CM: 715.97     Difficulty walking     ICD-10-CM: R26.2  ICD-9-CM: 719.7     Acute left-sided low back pain with left-sided sciatica     ICD-10-CM: M54.42  ICD-9-CM: 724.2, 724.3          Referring practitioner: Carlos Vincent MD  Date of Initial Visit: Type: THERAPY  Noted: 10/18/2021  Today's Date: 2021    VISIT#: 5    Subjective Patient reports noticing ability to do more around the house before having any pain in R foot/ankle. Pleased with progress.       Objective     See Exercise, Manual, and Modality Logs for complete treatment.     Assessment/Plan Patient continues to demonstrate improved activity tolerance with therapeutic exercise without increased pain. Patient continues to make gradual gains towards goals at this time.   Plan Goals: STG:  - Increase R ankle DF AROM to 5 deg in 3 weeks. MET  - Pt to perform SLS x10 sec in 3 weeks. Progressing currently 7 seconds  - Pt to perform HEP with minimal verbal cues in 3 weeks. MET  LTG:  - Improve FAAM score to 95% or better ability by discharge. NOT MET  - Increase R ankle eversion strength to 4/5 by discharge. Progressing  - Pt to report 50% or better improvement in frequency of R ankle rolling when walking on uneven surfaces by discharge. Progressing  - Pt to be independent with HEP by discharge. NOT MET  Progress per Plan of Care         Timed:          Manual Therapy:    5     mins  49083;     Therapeutic Exercise:    15     mins  78624;     Neuromuscular Aiden:    10    mins  93468;    Therapeutic Activity:     15     mins  43067;     Gait Training:           mins  85628;     Ultrasound:          mins  79130;    Ionto                                   mins   25056  Self Care                    _____  mins   84316  Canalith Repos                   mins  45264    Un-Timed:  Electrical Stimulation:         mins  12693 ( );  Dry Needling          mins self-pay  Traction          mins 84464    Timed Treatment:   45   mins   Total Treatment:     45   mins    Moisés Baker PTA  [place] License [id number]  Physical Therapist Assistant

## 2021-11-04 ENCOUNTER — TREATMENT (OUTPATIENT)
Dept: PHYSICAL THERAPY | Facility: CLINIC | Age: 76
End: 2021-11-04

## 2021-11-04 DIAGNOSIS — M25.371 ANKLE INSTABILITY, RIGHT: Primary | ICD-10-CM

## 2021-11-04 DIAGNOSIS — M19.071 DJD (DEGENERATIVE JOINT DISEASE), ANKLE AND FOOT, RIGHT: ICD-10-CM

## 2021-11-04 DIAGNOSIS — R26.2 DIFFICULTY WALKING: ICD-10-CM

## 2021-11-04 DIAGNOSIS — M54.42 ACUTE LEFT-SIDED LOW BACK PAIN WITH LEFT-SIDED SCIATICA: ICD-10-CM

## 2021-11-04 PROCEDURE — 97530 THERAPEUTIC ACTIVITIES: CPT | Performed by: PHYSICAL THERAPIST

## 2021-11-04 PROCEDURE — 97112 NEUROMUSCULAR REEDUCATION: CPT | Performed by: PHYSICAL THERAPIST

## 2021-11-04 PROCEDURE — 97110 THERAPEUTIC EXERCISES: CPT | Performed by: PHYSICAL THERAPIST

## 2021-11-04 NOTE — PROGRESS NOTES
Physical Therapy Daily Progress Note      Patient: Taqueria Starr   : 1945  Diagnosis/ICD-10 Code:  Ankle instability, right [M25.371]   Problems Addressed this Visit     None      Visit Diagnoses     Ankle instability, right    -  Primary    DJD (degenerative joint disease), ankle and foot, right        Difficulty walking        Acute left-sided low back pain with left-sided sciatica          Diagnoses       Codes Comments    Ankle instability, right    -  Primary ICD-10-CM: M25.371  ICD-9-CM: 718.87     DJD (degenerative joint disease), ankle and foot, right     ICD-10-CM: M19.071  ICD-9-CM: 715.97     Difficulty walking     ICD-10-CM: R26.2  ICD-9-CM: 719.7     Acute left-sided low back pain with left-sided sciatica     ICD-10-CM: M54.42  ICD-9-CM: 724.2, 724.3          Referring practitioner: Carlos Vincent MD  Date of Initial Visit: Type: THERAPY  Noted: 10/18/2021  Today's Date: 2021    VISIT#: 6    Subjective Patient reports continuing to feel a little more stable on feet with less pain with walking.       Objective added single leg step up on foam    See Exercise, Manual, and Modality Logs for complete treatment.     Assessment/Plan Patient tolerated progressed therapeutic exercise well, did require single UE support to maintain balance. Patient will continue to benefit from continued balance training and improved dorsiflexion for improved tolerance to daily functional activity.   Plan Goals: STG:  - Increase R ankle DF AROM to 5 deg in 3 weeks. MET  - Pt to perform SLS x10 sec in 3 weeks. Progressing currently 7 seconds  - Pt to perform HEP with minimal verbal cues in 3 weeks. MET  LTG:  - Improve FAAM score to 95% or better ability by discharge. NOT MET  - Increase R ankle eversion strength to 4/5 by discharge. Progressing  - Pt to report 50% or better improvement in frequency of R ankle rolling when walking on uneven surfaces by discharge. Progressing  - Pt to be independent with HEP by  discharge. NOT MET    Progress per Plan of Care         Timed:         Manual Therapy:    5     mins  81293;     Therapeutic Exercise:    15     mins  14898;     Neuromuscular Aiden:    10    mins  82030;    Therapeutic Activity:     15     mins  40872;     Gait Training:           mins  21740;     Ultrasound:          mins  40107;    Ionto                                   mins   98566  Self Care                    _____  mins   38563  Canalith Repos                   mins  52348    Un-Timed:  Electrical Stimulation:         mins  53464 ( );  Dry Needling          mins self-pay  Traction          mins 12310    Timed Treatment:   45   mins   Total Treatment:     45   mins    Moisés Baker PTA  [place] License [id number]  Physical Therapist Assistant

## 2021-11-09 ENCOUNTER — TREATMENT (OUTPATIENT)
Dept: PHYSICAL THERAPY | Facility: CLINIC | Age: 76
End: 2021-11-09

## 2021-11-09 DIAGNOSIS — M54.42 ACUTE LEFT-SIDED LOW BACK PAIN WITH LEFT-SIDED SCIATICA: ICD-10-CM

## 2021-11-09 DIAGNOSIS — M19.071 DJD (DEGENERATIVE JOINT DISEASE), ANKLE AND FOOT, RIGHT: ICD-10-CM

## 2021-11-09 DIAGNOSIS — R26.2 DIFFICULTY WALKING: ICD-10-CM

## 2021-11-09 DIAGNOSIS — M25.371 ANKLE INSTABILITY, RIGHT: Primary | ICD-10-CM

## 2021-11-09 PROCEDURE — 97112 NEUROMUSCULAR REEDUCATION: CPT | Performed by: PHYSICAL THERAPIST

## 2021-11-09 PROCEDURE — 97530 THERAPEUTIC ACTIVITIES: CPT | Performed by: PHYSICAL THERAPIST

## 2021-11-09 PROCEDURE — 97110 THERAPEUTIC EXERCISES: CPT | Performed by: PHYSICAL THERAPIST

## 2021-11-09 NOTE — PROGRESS NOTES
Physical Therapy Daily Progress Note      Patient: Taqueria Starr   : 1945  Diagnosis/ICD-10 Code:  Ankle instability, right [M25.371]   Problems Addressed this Visit     None      Visit Diagnoses     Ankle instability, right    -  Primary    DJD (degenerative joint disease), ankle and foot, right        Difficulty walking        Acute left-sided low back pain with left-sided sciatica          Diagnoses       Codes Comments    Ankle instability, right    -  Primary ICD-10-CM: M25.371  ICD-9-CM: 718.87     DJD (degenerative joint disease), ankle and foot, right     ICD-10-CM: M19.071  ICD-9-CM: 715.97     Difficulty walking     ICD-10-CM: R26.2  ICD-9-CM: 719.7     Acute left-sided low back pain with left-sided sciatica     ICD-10-CM: M54.42  ICD-9-CM: 724.2, 724.3          Referring practitioner: Carlos Vincent MD  Date of Initial Visit: Type: THERAPY  Noted: 10/18/2021  Today's Date: 2021    VISIT#: 7    Subjective Patient reports feeling good with increased activity levels with no irritation to report.       Objective added step ups on foam    See Exercise, Manual, and Modality Logs for complete treatment.     Assessment/Plan Patient tolerated progressed therapeutic exercise well with no reports of increased symptoms. Patient continues to demonstrate good progress towards goals with improved ankle DF and strength.   Plan Goals: STG:  - Increase R ankle DF AROM to 5 deg in 3 weeks. MET  - Pt to perform SLS x10 sec in 3 weeks. Progressing currently 7 seconds  - Pt to perform HEP with minimal verbal cues in 3 weeks. MET  LTG:  - Improve FAAM score to 95% or better ability by discharge. NOT MET  - Increase R ankle eversion strength to 4/5 by discharge. Progressing  - Pt to report 50% or better improvement in frequency of R ankle rolling when walking on uneven surfaces by discharge. Progressing  - Pt to be independent with HEP by discharge. NOT MET  Progress per Plan of Care         Timed:          Manual Therapy:    5     mins  95800;     Therapeutic Exercise:    15     mins  68817;     Neuromuscular Aiden:    10    mins  46773;    Therapeutic Activity:     15     mins  62414;     Gait Training:           mins  40905;     Ultrasound:          mins  77596;    Ionto                                   mins   24822  Self Care                    _____  mins   07421  Canalith Repos                   mins  14987    Un-Timed:  Electrical Stimulation:         mins  03675 ( );  Dry Needling          mins self-pay  Traction          mins 54453    Timed Treatment:   45   mins   Total Treatment:     45   mins    Moisés Baker PTA  [place] License [id number]  Physical Therapist Assistant

## 2021-11-11 ENCOUNTER — TREATMENT (OUTPATIENT)
Dept: PHYSICAL THERAPY | Facility: CLINIC | Age: 76
End: 2021-11-11

## 2021-11-11 DIAGNOSIS — M19.071 DJD (DEGENERATIVE JOINT DISEASE), ANKLE AND FOOT, RIGHT: ICD-10-CM

## 2021-11-11 DIAGNOSIS — M25.371 ANKLE INSTABILITY, RIGHT: Primary | ICD-10-CM

## 2021-11-11 DIAGNOSIS — R26.2 DIFFICULTY WALKING: ICD-10-CM

## 2021-11-11 PROCEDURE — 97110 THERAPEUTIC EXERCISES: CPT | Performed by: PHYSICAL THERAPIST

## 2021-11-11 PROCEDURE — 97530 THERAPEUTIC ACTIVITIES: CPT | Performed by: PHYSICAL THERAPIST

## 2021-11-11 PROCEDURE — 97112 NEUROMUSCULAR REEDUCATION: CPT | Performed by: PHYSICAL THERAPIST

## 2021-11-11 NOTE — PROGRESS NOTES
Physical Therapy Daily Progress Note    VISIT#: 8    Subjective   Taqueria Starr reports that he is doing well and expresses that he wants today to be his last day.   Pain Ratin    Objective     See Exercise, Manual, and Modality Logs for complete treatment.     FAAM score: 4% disability (81 pts)    Patient Education: HEP for DC    LTG:  - Improve FAAM score to 95% or better ability by discharge. MET  - Increase R ankle eversion strength to 4/5 by discharge. MET  - Pt to report 50% or better improvement in frequency of R ankle rolling when walking on uneven surfaces by discharge. MET  - Pt to be independent with HEP by discharge. MET    Assessment/Plan   Pt met all goals and has less ankle pain and improved stability/strength. Pt has increased ease with ADLs  and work duties and is appropriate for DC to HEP.     Other DC to HEP          Timed:         Manual Therapy:    5     mins  44273;     Therapeutic Exercise:    10     mins  17755;     Neuromuscular Aiden:    10    mins  22628;    Therapeutic Activity:     15     mins  84602;     Gait Training:           mins  67660;     Ultrasound:          mins  39349;    Ionto                                   mins   36960  Self Care                            mins   78774  Canalith Repos                   mins  80980    Un-Timed:  Electrical Stimulation:         mins  11771 ( );  Dry Needling          mins self-pay  Traction          mins 38095  Low Eval          Mins  06392  Mod Eval          Mins  09536  High Eval                            Mins  73720  Re-Eval                               mins  07179    Timed Treatment:   40   mins   Total Treatment:     40   mins    Lucy Costa  Physical Therapist Assistant  [place] License # [id number]

## 2021-11-12 NOTE — PROGRESS NOTES
Discharge Summary  Discharge Summary from Physical Therapy Report      Dates  PT visit: 10/18/21 - 11/11/21  Number of Visits: 8     Discharge Status of Patient: See MD Note dated 11/11/21    Goals: All Met    Discharge Plan: Continue with current home exercise program as instructed    Comments Patient with improved R ankle strength and ROM with all goals currently met.  He expressed readiness to discharge with HEP at this time.  Patient to follow up with MD as needed.    Date of Discharge 11/11/21        Beryl Gill, PT, DPT  Physical Therapist

## 2021-11-12 NOTE — PROGRESS NOTES
I have reviewed the notes, assessments, and/or procedures performed by Lucy Costa, I concur with her/his documentation of Taqueria Starr.

## 2022-10-07 ENCOUNTER — APPOINTMENT (OUTPATIENT)
Dept: CT IMAGING | Facility: HOSPITAL | Age: 77
End: 2022-10-07

## 2022-10-07 ENCOUNTER — HOSPITAL ENCOUNTER (EMERGENCY)
Facility: HOSPITAL | Age: 77
Discharge: HOME | End: 2022-10-08
Attending: EMERGENCY MEDICINE | Admitting: EMERGENCY MEDICINE

## 2022-10-07 DIAGNOSIS — R51.9 ACUTE NONINTRACTABLE HEADACHE, UNSPECIFIED HEADACHE TYPE: Primary | ICD-10-CM

## 2022-10-07 DIAGNOSIS — R42 DIZZINESS: ICD-10-CM

## 2022-10-07 LAB
ALBUMIN SERPL-MCNC: 3.8 G/DL (ref 3.5–5.2)
ALBUMIN/GLOB SERPL: 1.2 G/DL
ALP SERPL-CCNC: 91 U/L (ref 39–117)
ALT SERPL W P-5'-P-CCNC: 9 U/L (ref 1–41)
ANION GAP SERPL CALCULATED.3IONS-SCNC: 10 MMOL/L (ref 5–15)
AST SERPL-CCNC: 17 U/L (ref 1–40)
BASOPHILS # BLD AUTO: 0.1 10*3/MM3 (ref 0–0.2)
BASOPHILS NFR BLD AUTO: 0.9 % (ref 0–1.5)
BILIRUB SERPL-MCNC: 0.4 MG/DL (ref 0–1.2)
BUN SERPL-MCNC: 16 MG/DL (ref 8–23)
BUN/CREAT SERPL: 12.7 (ref 7–25)
CALCIUM SPEC-SCNC: 10.3 MG/DL (ref 8.6–10.5)
CHLORIDE SERPL-SCNC: 106 MMOL/L (ref 98–107)
CO2 SERPL-SCNC: 26 MMOL/L (ref 22–29)
CREAT SERPL-MCNC: 1.26 MG/DL (ref 0.76–1.27)
DEPRECATED RDW RBC AUTO: 42.9 FL (ref 37–54)
EGFRCR SERPLBLD CKD-EPI 2021: 58 ML/MIN/1.73
EOSINOPHIL # BLD AUTO: 0.3 10*3/MM3 (ref 0–0.4)
EOSINOPHIL NFR BLD AUTO: 4.3 % (ref 0.3–6.2)
ERYTHROCYTE [DISTWIDTH] IN BLOOD BY AUTOMATED COUNT: 13.6 % (ref 12.3–15.4)
GLOBULIN UR ELPH-MCNC: 3.3 GM/DL
GLUCOSE SERPL-MCNC: 119 MG/DL (ref 65–99)
HCT VFR BLD AUTO: 41.6 % (ref 37.5–51)
HGB BLD-MCNC: 14 G/DL (ref 13–17.7)
LYMPHOCYTES # BLD AUTO: 1.4 10*3/MM3 (ref 0.7–3.1)
LYMPHOCYTES NFR BLD AUTO: 20.2 % (ref 19.6–45.3)
MAGNESIUM SERPL-MCNC: 1.8 MG/DL (ref 1.6–2.4)
MCH RBC QN AUTO: 30.5 PG (ref 26.6–33)
MCHC RBC AUTO-ENTMCNC: 33.6 G/DL (ref 31.5–35.7)
MCV RBC AUTO: 90.8 FL (ref 79–97)
MONOCYTES # BLD AUTO: 0.8 10*3/MM3 (ref 0.1–0.9)
MONOCYTES NFR BLD AUTO: 11.4 % (ref 5–12)
NEUTROPHILS NFR BLD AUTO: 4.3 10*3/MM3 (ref 1.7–7)
NEUTROPHILS NFR BLD AUTO: 63.2 % (ref 42.7–76)
NRBC BLD AUTO-RTO: 0 /100 WBC (ref 0–0.2)
PLATELET # BLD AUTO: 298 10*3/MM3 (ref 140–450)
PMV BLD AUTO: 6.9 FL (ref 6–12)
POTASSIUM SERPL-SCNC: 3.6 MMOL/L (ref 3.5–5.2)
PROT SERPL-MCNC: 7.1 G/DL (ref 6–8.5)
RBC # BLD AUTO: 4.58 10*6/MM3 (ref 4.14–5.8)
SODIUM SERPL-SCNC: 142 MMOL/L (ref 136–145)
TROPONIN T SERPL-MCNC: <0.01 NG/ML (ref 0–0.03)
WBC NRBC COR # BLD: 6.8 10*3/MM3 (ref 3.4–10.8)

## 2022-10-07 PROCEDURE — 93005 ELECTROCARDIOGRAM TRACING: CPT | Performed by: EMERGENCY MEDICINE

## 2022-10-07 PROCEDURE — 83735 ASSAY OF MAGNESIUM: CPT | Performed by: EMERGENCY MEDICINE

## 2022-10-07 PROCEDURE — 96365 THER/PROPH/DIAG IV INF INIT: CPT

## 2022-10-07 PROCEDURE — 96366 THER/PROPH/DIAG IV INF ADDON: CPT

## 2022-10-07 PROCEDURE — 70450 CT HEAD/BRAIN W/O DYE: CPT

## 2022-10-07 PROCEDURE — 84484 ASSAY OF TROPONIN QUANT: CPT | Performed by: EMERGENCY MEDICINE

## 2022-10-07 PROCEDURE — 80053 COMPREHEN METABOLIC PANEL: CPT | Performed by: EMERGENCY MEDICINE

## 2022-10-07 PROCEDURE — 99284 EMERGENCY DEPT VISIT MOD MDM: CPT

## 2022-10-07 PROCEDURE — 85025 COMPLETE CBC W/AUTO DIFF WBC: CPT | Performed by: EMERGENCY MEDICINE

## 2022-10-07 PROCEDURE — 25010000002 MAGNESIUM SULFATE 2 GM/50ML SOLUTION: Performed by: EMERGENCY MEDICINE

## 2022-10-07 RX ORDER — SODIUM CHLORIDE 0.9 % (FLUSH) 0.9 %
10 SYRINGE (ML) INJECTION AS NEEDED
Status: DISCONTINUED | OUTPATIENT
Start: 2022-10-07 | End: 2022-10-08 | Stop reason: HOSPADM

## 2022-10-07 RX ORDER — MAGNESIUM SULFATE HEPTAHYDRATE 40 MG/ML
2 INJECTION, SOLUTION INTRAVENOUS ONCE
Status: COMPLETED | OUTPATIENT
Start: 2022-10-07 | End: 2022-10-08

## 2022-10-07 RX ADMIN — MAGNESIUM SULFATE HEPTAHYDRATE 2 G: 2 INJECTION, SOLUTION INTRAVENOUS at 22:15

## 2022-10-08 VITALS
BODY MASS INDEX: 31.83 KG/M2 | HEART RATE: 81 BPM | OXYGEN SATURATION: 95 % | DIASTOLIC BLOOD PRESSURE: 63 MMHG | HEIGHT: 68 IN | TEMPERATURE: 97.9 F | SYSTOLIC BLOOD PRESSURE: 113 MMHG | WEIGHT: 210 LBS | RESPIRATION RATE: 19 BRPM

## 2022-10-08 LAB
QT INTERVAL: 375 MS
QT INTERVAL: 477 MS

## 2022-10-08 NOTE — ED NOTES
Patient had a headache and dizziness around 1800.  Pain came on, peaked, and then went back down.  Denies headache and dizziness at this time.

## 2022-10-08 NOTE — ED NOTES
Patient medicated per orders.  Call light within reach.  No new complaints.  Patient is resting.  Brother at bedside.

## 2022-10-08 NOTE — ED PROVIDER NOTES
Subjective   History of Present Illness  77-year-old male with history of tension headaches, arthritis presents for headache, dizziness.  Cute onset an hour prior to arrival.  Patient states he is asymptomatic now.  Never had symptoms like this before.  Denies having any chest pain, shortness of breath, weakness, numbness, abdominal pain.  No fevers or chills.  States he just took his home meds and it went away.  Review of Systems   Respiratory: Negative for shortness of breath.    Cardiovascular: Negative for chest pain.   Gastrointestinal: Negative for abdominal pain and vomiting.   Neurological: Positive for dizziness and headaches.   All other systems reviewed and are negative.      Past Medical History:   Diagnosis Date   • Arthritis    • Cataract    • H/O total shoulder replacement, right 2015   • Head injury    • Headache, tension-type    • Hepatitis A        No Known Allergies    Past Surgical History:   Procedure Laterality Date   • TOTAL SHOULDER REPLACEMENT         Family History   Problem Relation Age of Onset   • Stroke Father    • Heart disease Maternal Grandmother    • Stroke Maternal Grandfather    • Diabetes Paternal Grandfather        Social History     Socioeconomic History   • Marital status:    Tobacco Use   • Smoking status: Never Smoker   Vaping Use   • Vaping Use: Never used   Substance and Sexual Activity   • Alcohol use: Yes     Comment: HOLIDAYS   • Drug use: No           Objective   Physical Exam  Constitutional:  No acute distress.  Head:  Atraumatic.  Normocephalic.   Eyes:  No scleral icterus. Normal conjunctiva  ENT:  Moist mucosa.  No nasal discharge present.  Cardiovascular:  Well perfused.  Equal pulses.  Regular rate.  Normal capillary refill.    Pulmonary/Chest:  No respiratory distress.  Airway patent.  No tachypnea.  No accessory muscle usage.    Abdominal:  Non-distended. Non-tender.   Extremities:  No peripheral edema.  No Deformity  Skin:  Warm, dry  Neurological:   "Alert and oriented to person place time and situation. PERRL.  EOMI.  Cranial nerves II-XII are intact.  Strength is equal and 5/5 in the upper and lower extremities bilaterally.  No sensory deficits to light touch.  No pronator drift.  Normal speech.  Normal cerebellar function during finger-nose-finger and heel to shin.         Procedures           ED Course      /80 (BP Location: Right arm, Patient Position: Lying)   Pulse 91   Temp 97.9 °F (36.6 °C)   Resp 19   Ht 172.7 cm (68\")   Wt 95.3 kg (210 lb)   SpO2 94%   BMI 31.93 kg/m²   Labs Reviewed   COMPREHENSIVE METABOLIC PANEL - Abnormal; Notable for the following components:       Result Value    Glucose 119 (*)     eGFR 58.0 (*)     All other components within normal limits    Narrative:     GFR Normal >60  Chronic Kidney Disease <60  Kidney Failure <15     TROPONIN (IN-HOUSE) - Normal    Narrative:     Troponin T Reference Range:  <= 0.03 ng/mL-   Negative for AMI  >0.03 ng/mL-     Abnormal for myocardial necrosis.  Clinicians would have to utilize clinical acumen, EKG, Troponin and serial changes to determine if it is an Acute Myocardial Infarction or myocardial injury due to an underlying chronic condition.       Results may be falsely decreased if patient taking Biotin.     CBC WITH AUTO DIFFERENTIAL - Normal   MAGNESIUM - Normal   CBC AND DIFFERENTIAL    Narrative:     The following orders were created for panel order CBC & Differential.  Procedure                               Abnormality         Status                     ---------                               -----------         ------                     CBC Auto Differential[[id number]]        Normal              Final result                 Please view results for these tests on the individual orders.     Medications   sodium chloride 0.9 % flush 10 mL (has no administration in time range)   magnesium sulfate 2g/50 mL (PREMIX) infusion (2 g Intravenous New Bag 10/7/22 6163)     CT Head " Without Contrast    Result Date: 10/7/2022   1. No acute intracranial hemorrhage or mass effect 2. Diffuse atrophy 3. Moderate to advanced white matter changes most compatible small vessel ischemic disease in this age group. MRI is more sensitive for acute ischemic change  Electronically Signed By-Luis Yip On:10/7/2022 10:47 PM This report was finalized on [id number] by  Luis Yip, .                                         [facility]  EKG # 1  Signed and interpreted by the EP.  Time Interpreted: 9:10 PM  Rate: 95  Rhythm: Normal sinus  Axis: Normal axis  Intervals: First-degree AV block  ST Segments: No acute ischemic changes     EKG # 2  Signed and interpreted by the EP.  Time Interpreted: 11:48 PM  Rate: 79  Rhythm: Normal sinus rhythm  Axis: Normal axis  Intervals: First-degree AV block  ST Segments: No acute ischemic change     Comparison: No significant change from EKG from 9:10 PM.    Differential Dx (Includes but not limited to): TIA, CVA, arrhythmia, complex migraine, vertigo  Medical Records Reviewed: Note from neurologist Dr. Taqueria Sebastian from December 9, 2016.  Labs: Mildly low magnesium.  Otherwise unremarkable.  Imaging: CT head unremarkable.  No intracranial hemorrhage.  Telemetry: The cardiac monitor revealed normal sinus rhythm at a rate of 87 as interpreted by me. The cardiac monitor was ordered secondary to the patient’s history of dizziness and to monitor the patient for dysrhythmia.    Discussion: Patient completely asymptomatic for entirety of emergency department stay here.  Some artifact in initial EKG made it difficult to determine the patient with prolonged QTC.  Given magnesium and repeat with normal QTC is actually a prolonged MO interval consistent with first-degree AV block suspect was an issue on the first EKG as well.  Patient nontoxic-appearing and tolerating p.o. here.  Will DC with close PCP follow-up.  Patient agreeable with this plan.        Final diagnoses:   Acute  nonintractable headache, unspecified headache type   Dizziness       ED Disposition  ED Disposition     ED Disposition   Discharge    Condition   Stable    Comment   --             Reagan Rogers MD  [address] [place] [facility]  [phone]    In 2 days           Medication List      No changes were made to your prescriptions during this visit.          Jose Loredo MD  10/07/22 1429

## 2022-10-08 NOTE — ED NOTES
Patient states he is supposed to drink a lot of water daily per his doctor.  He states he hasn't drank enough today.  He has urinated today.